# Patient Record
Sex: FEMALE | Race: WHITE | Employment: FULL TIME | URBAN - METROPOLITAN AREA
[De-identification: names, ages, dates, MRNs, and addresses within clinical notes are randomized per-mention and may not be internally consistent; named-entity substitution may affect disease eponyms.]

---

## 2020-11-06 PROBLEM — O09.511 PRIMIGRAVIDA OF ADVANCED MATERNAL AGE IN FIRST TRIMESTER: Status: ACTIVE | Noted: 2020-11-06

## 2020-11-06 PROBLEM — O09.891 MEDICATION EXPOSURE DURING FIRST TRIMESTER OF PREGNANCY: Status: ACTIVE | Noted: 2020-11-06

## 2021-01-17 ENCOUNTER — HOSPITAL ENCOUNTER (EMERGENCY)
Age: 41
Discharge: HOME | End: 2021-01-18
Attending: EMERGENCY MEDICINE
Payer: COMMERCIAL

## 2021-01-17 ENCOUNTER — APPOINTMENT (OUTPATIENT)
Dept: ULTRASOUND IMAGING | Age: 41
End: 2021-01-17
Payer: COMMERCIAL

## 2021-01-17 ENCOUNTER — HOSPITAL ENCOUNTER (OUTPATIENT)
Age: 41
Discharge: HOME | End: 2021-01-17
Attending: OBSTETRICS & GYNECOLOGY | Admitting: OBSTETRICS & GYNECOLOGY
Payer: COMMERCIAL

## 2021-01-17 VITALS
TEMPERATURE: 97.9 F | OXYGEN SATURATION: 98 % | WEIGHT: 150 LBS | RESPIRATION RATE: 15 BRPM | BODY MASS INDEX: 27.6 KG/M2 | DIASTOLIC BLOOD PRESSURE: 67 MMHG | HEIGHT: 62 IN | HEART RATE: 99 BPM | SYSTOLIC BLOOD PRESSURE: 118 MMHG

## 2021-01-17 VITALS
TEMPERATURE: 98.1 F | SYSTOLIC BLOOD PRESSURE: 118 MMHG | RESPIRATION RATE: 16 BRPM | HEART RATE: 79 BPM | DIASTOLIC BLOOD PRESSURE: 66 MMHG

## 2021-01-17 DIAGNOSIS — R10.9 RIGHT FLANK PAIN: Primary | ICD-10-CM

## 2021-01-17 PROBLEM — M54.9 BACK PAIN COMPLICATING PREGNANCY IN SECOND TRIMESTER: Status: ACTIVE | Noted: 2021-01-17

## 2021-01-17 PROBLEM — Z3A.20 20 WEEKS GESTATION OF PREGNANCY: Status: ACTIVE | Noted: 2021-01-17

## 2021-01-17 PROBLEM — O99.891 BACK PAIN COMPLICATING PREGNANCY IN SECOND TRIMESTER: Status: ACTIVE | Noted: 2021-01-17

## 2021-01-17 LAB
ALBUMIN SERPL-MCNC: 3.5 G/DL (ref 3.5–5.2)
ALP BLD-CCNC: 66 U/L (ref 35–104)
ALT SERPL-CCNC: 15 U/L (ref 0–32)
AMPHETAMINE SCREEN, URINE: NOT DETECTED
ANION GAP SERPL CALCULATED.3IONS-SCNC: 7 MMOL/L (ref 7–16)
AST SERPL-CCNC: 16 U/L (ref 0–31)
BARBITURATE SCREEN URINE: NOT DETECTED
BENZODIAZEPINE SCREEN, URINE: NOT DETECTED
BILIRUB SERPL-MCNC: 0.4 MG/DL (ref 0–1.2)
BILIRUBIN URINE: NEGATIVE
BLOOD, URINE: NEGATIVE
BUN BLDV-MCNC: 8 MG/DL (ref 6–20)
CALCIUM SERPL-MCNC: 8.4 MG/DL (ref 8.6–10.2)
CANNABINOID SCREEN URINE: NOT DETECTED
CHLORIDE BLD-SCNC: 101 MMOL/L (ref 98–107)
CLARITY: CLEAR
CO2: 24 MMOL/L (ref 22–29)
COCAINE METABOLITE SCREEN URINE: NOT DETECTED
COLOR: YELLOW
CREAT SERPL-MCNC: 0.7 MG/DL (ref 0.5–1)
FENTANYL SCREEN, URINE: NOT DETECTED
GFR AFRICAN AMERICAN: >60
GFR NON-AFRICAN AMERICAN: >60 ML/MIN/1.73
GLUCOSE BLD-MCNC: 113 MG/DL (ref 74–99)
GLUCOSE URINE: NEGATIVE MG/DL
HCT VFR BLD CALC: 35.8 % (ref 34–48)
HEMOGLOBIN: 11.7 G/DL (ref 11.5–15.5)
KETONES, URINE: NEGATIVE MG/DL
LEUKOCYTE ESTERASE, URINE: NEGATIVE
LIPASE: 12 U/L (ref 13–60)
Lab: NORMAL
MCH RBC QN AUTO: 28.7 PG (ref 26–35)
MCHC RBC AUTO-ENTMCNC: 32.7 % (ref 32–34.5)
MCV RBC AUTO: 88 FL (ref 80–99.9)
METHADONE SCREEN, URINE: NOT DETECTED
NITRITE, URINE: NEGATIVE
OPIATE SCREEN URINE: NOT DETECTED
OXYCODONE URINE: NOT DETECTED
PDW BLD-RTO: 13.2 FL (ref 11.5–15)
PH UA: 6 (ref 5–9)
PHENCYCLIDINE SCREEN URINE: NOT DETECTED
PLATELET # BLD: 294 E9/L (ref 130–450)
PMV BLD AUTO: 9.3 FL (ref 7–12)
POTASSIUM REFLEX MAGNESIUM: 3.7 MMOL/L (ref 3.5–5)
PROTEIN UA: NEGATIVE MG/DL
RBC # BLD: 4.07 E12/L (ref 3.5–5.5)
SODIUM BLD-SCNC: 132 MMOL/L (ref 132–146)
SPECIFIC GRAVITY UA: 1.02 (ref 1–1.03)
TOTAL PROTEIN: 6.1 G/DL (ref 6.4–8.3)
UROBILINOGEN, URINE: 0.2 E.U./DL
WBC # BLD: 10.5 E9/L (ref 4.5–11.5)

## 2021-01-17 PROCEDURE — 96374 THER/PROPH/DIAG INJ IV PUSH: CPT

## 2021-01-17 PROCEDURE — 99211 OFF/OP EST MAY X REQ PHY/QHP: CPT

## 2021-01-17 PROCEDURE — 80307 DRUG TEST PRSMV CHEM ANLYZR: CPT

## 2021-01-17 PROCEDURE — 99235 HOSP IP/OBS SAME DATE MOD 70: CPT | Performed by: OBSTETRICS & GYNECOLOGY

## 2021-01-17 PROCEDURE — 99285 EMERGENCY DEPT VISIT HI MDM: CPT

## 2021-01-17 PROCEDURE — 80053 COMPREHEN METABOLIC PANEL: CPT

## 2021-01-17 PROCEDURE — 6370000000 HC RX 637 (ALT 250 FOR IP): Performed by: STUDENT IN AN ORGANIZED HEALTH CARE EDUCATION/TRAINING PROGRAM

## 2021-01-17 PROCEDURE — 96375 TX/PRO/DX INJ NEW DRUG ADDON: CPT

## 2021-01-17 PROCEDURE — 6360000002 HC RX W HCPCS: Performed by: STUDENT IN AN ORGANIZED HEALTH CARE EDUCATION/TRAINING PROGRAM

## 2021-01-17 PROCEDURE — 81003 URINALYSIS AUTO W/O SCOPE: CPT

## 2021-01-17 PROCEDURE — 76770 US EXAM ABDO BACK WALL COMP: CPT

## 2021-01-17 PROCEDURE — 83690 ASSAY OF LIPASE: CPT

## 2021-01-17 PROCEDURE — 36415 COLL VENOUS BLD VENIPUNCTURE: CPT

## 2021-01-17 PROCEDURE — 6370000000 HC RX 637 (ALT 250 FOR IP): Performed by: OBSTETRICS & GYNECOLOGY

## 2021-01-17 PROCEDURE — 85027 COMPLETE CBC AUTOMATED: CPT

## 2021-01-17 RX ORDER — ASPIRIN 81 MG/1
81 TABLET ORAL DAILY
COMMUNITY

## 2021-01-17 RX ORDER — METOCLOPRAMIDE HYDROCHLORIDE 5 MG/ML
10 INJECTION INTRAMUSCULAR; INTRAVENOUS ONCE
Status: COMPLETED | OUTPATIENT
Start: 2021-01-17 | End: 2021-01-17

## 2021-01-17 RX ORDER — ACETAMINOPHEN 500 MG
1000 TABLET ORAL ONCE
Status: COMPLETED | OUTPATIENT
Start: 2021-01-17 | End: 2021-01-17

## 2021-01-17 RX ORDER — DIPHENHYDRAMINE HYDROCHLORIDE 50 MG/ML
25 INJECTION INTRAMUSCULAR; INTRAVENOUS ONCE
Status: COMPLETED | OUTPATIENT
Start: 2021-01-17 | End: 2021-01-17

## 2021-01-17 RX ORDER — FENTANYL CITRATE 50 UG/ML
25 INJECTION, SOLUTION INTRAMUSCULAR; INTRAVENOUS ONCE
Status: COMPLETED | OUTPATIENT
Start: 2021-01-17 | End: 2021-01-17

## 2021-01-17 RX ADMIN — METOCLOPRAMIDE 10 MG: 5 INJECTION, SOLUTION INTRAMUSCULAR; INTRAVENOUS at 23:24

## 2021-01-17 RX ADMIN — DIPHENHYDRAMINE HYDROCHLORIDE 25 MG: 50 INJECTION, SOLUTION INTRAMUSCULAR; INTRAVENOUS at 23:24

## 2021-01-17 RX ADMIN — ACETAMINOPHEN 1000 MG: 500 TABLET ORAL at 12:50

## 2021-01-17 RX ADMIN — ACETAMINOPHEN 1000 MG: 500 TABLET ORAL at 23:24

## 2021-01-17 RX ADMIN — FENTANYL CITRATE 25 MCG: 50 INJECTION, SOLUTION INTRAMUSCULAR; INTRAVENOUS at 23:24

## 2021-01-17 ASSESSMENT — ENCOUNTER SYMPTOMS
VOMITING: 1
COUGH: 0
WHEEZING: 0
SINUS PRESSURE: 0
ABDOMINAL PAIN: 0
EYE REDNESS: 0
BLOOD IN STOOL: 0
EYE PAIN: 0
EYE DISCHARGE: 0
ABDOMINAL DISTENTION: 0
SHORTNESS OF BREATH: 0
DIARRHEA: 0
CONSTIPATION: 0
NAUSEA: 1
BACK PAIN: 0
SORE THROAT: 0

## 2021-01-17 ASSESSMENT — PAIN DESCRIPTION - ONSET: ONSET: GRADUAL

## 2021-01-17 ASSESSMENT — PAIN DESCRIPTION - LOCATION: LOCATION: FLANK

## 2021-01-17 ASSESSMENT — PAIN DESCRIPTION - PAIN TYPE: TYPE: ACUTE PAIN

## 2021-01-17 ASSESSMENT — PAIN DESCRIPTION - PROGRESSION: CLINICAL_PROGRESSION: GRADUALLY WORSENING

## 2021-01-17 ASSESSMENT — PAIN SCALES - GENERAL
PAINLEVEL_OUTOF10: 10
PAINLEVEL_OUTOF10: 10

## 2021-01-17 ASSESSMENT — PAIN DESCRIPTION - DESCRIPTORS: DESCRIPTORS: ACHING;CONSTANT

## 2021-01-17 ASSESSMENT — PAIN DESCRIPTION - FREQUENCY: FREQUENCY: CONTINUOUS

## 2021-01-17 NOTE — H&P
Subjective:      Diana Prader is an 45 y.o. female at 21 and 5/7 weeks gestation presenting with   Chief Complaint   Patient presents with    Abdominal Cramping    Back Pain   Patient claims that her symptoms started after trying to lift something heavy and she believes she pulled a muscle. Denies bleeding or fluid leak. Other associated symptoms include low back pain. Fetal Movement: normal.  Past Medical History:   Diagnosis Date    Anxiety     Depression        Review of Systems  Pertinent items are noted in HPI. Objective:      /66   Pulse 79   Temp 98.1 °F (36.7 °C) (Oral)   Resp 16   LMP 08/25/2020   Blood pressure 118/66, pulse 79, temperature 98.1 °F (36.7 °C), temperature source Oral, resp. rate 16, last menstrual period 08/25/2020. General:   alert, appears stated age and cooperative   Cervix:   Deferred.    FHT:   135 BPM   No CVA tenderness  Lab Review    CBC:   Lab Results   Component Value Date    WBC 10.5 01/17/2021    RBC 4.07 01/17/2021    HGB 11.7 01/17/2021    HCT 35.8 01/17/2021    MCV 88.0 01/17/2021    MCH 28.7 01/17/2021    MCHC 32.7 01/17/2021    RDW 13.2 01/17/2021     01/17/2021    MPV 9.3 01/17/2021     CMP:  No results found for: NA, K, CL, CO2, BUN, CREATININE, GFRAA, AGRATIO, LABGLOM, GLUCOSE, PROT, LABALBU, CALCIUM, BILITOT, ALKPHOS, AST, ALT  U/A:    Lab Results   Component Value Date    COLORU Yellow 01/17/2021    PHUR 6.0 01/17/2021    CLARITYU Clear 01/17/2021    SPECGRAV 1.020 01/17/2021    LEUKOCYTESUR Negative 01/17/2021    UROBILINOGEN 0.2 01/17/2021    BILIRUBINUR Negative 01/17/2021    BLOODU Negative 01/17/2021    GLUCOSEU Negative 01/17/2021          Assessment:     Right lower back pain, consistent with muscle injury  20 weeks gestation    Plan:     Heating pad, Tylenol as needed, CBC and UA    Amine R MD Be,1/17/2021 12:24 PM

## 2021-01-17 NOTE — PROGRESS NOTES
Discharge instructions given to pt. Pt verbalizes understanding of instructions. Pt leaves unit ambulatory with significant other.

## 2021-01-17 NOTE — PROGRESS NOTES
Patient presents to labor and delivery , @20.5 with complaints states she thinks she pulled a muscle on her lower right side of her back. She states she then began feeling nauseas and cramping. She rates it a 5/10 describes as a aching feeling. Denies LOF and VB. States she does feel movement at times.

## 2021-01-18 RX ORDER — ACETAMINOPHEN 500 MG
500 TABLET ORAL EVERY 6 HOURS PRN
Qty: 60 TABLET | Refills: 0 | Status: SHIPPED | OUTPATIENT
Start: 2021-01-18

## 2021-01-18 NOTE — ED PROVIDER NOTES
HPI   78-year-old female patient presented to emergency department with right flank pain for the last 2 days. Patient is 21 weeks pregnant. Patient was seen upstairs by OB earlier today and was told that her fetus is okay. Patient was discharged home she been having persistent right-sided flank pain with nausea and vomiting. Patient says that she cannot keep anything down. No diarrhea no constipation, no cough, no leg swelling, no chest pain, no shortness of breath. Patient describes her flank pain as constant, throbbing, not better or worse with anything, she cannot find a position of comfort, pain is nonradiating. She is not having any abdominal pain. She has a history of kidney stone and feels like today's pain is similar. She does recall lifting heavy pack of water bottles a day prior to pain onset but does not remember any direct injury or trauma to her back. Review of Systems   Constitutional: Negative for chills and fever. HENT: Negative for ear pain, sinus pressure and sore throat. Eyes: Negative for pain, discharge and redness. Respiratory: Negative for cough, shortness of breath and wheezing. Cardiovascular: Negative for chest pain. Gastrointestinal: Positive for nausea and vomiting. Negative for abdominal distention, abdominal pain, blood in stool, constipation and diarrhea. Genitourinary: Negative for dysuria, frequency, vaginal bleeding and vaginal discharge. No gush of fluids   Musculoskeletal: Negative for arthralgias and back pain. Right sided flank pain   Skin: Negative for rash and wound. Neurological: Negative for weakness and headaches. Hematological: Negative for adenopathy. All other systems reviewed and are negative. Physical Exam  Vitals signs and nursing note reviewed. Constitutional:       Appearance: Normal appearance. HENT:      Head: Normocephalic and atraumatic. Nose: Nose normal. No congestion.       Mouth/Throat: Mouth: Mucous membranes are moist.      Pharynx: Oropharynx is clear. Eyes:      Conjunctiva/sclera: Conjunctivae normal.      Pupils: Pupils are equal, round, and reactive to light. Neck:      Musculoskeletal: Normal range of motion and neck supple. Cardiovascular:      Rate and Rhythm: Normal rate and regular rhythm. Pulses: Normal pulses. Heart sounds: Normal heart sounds. Pulmonary:      Effort: Pulmonary effort is normal. No respiratory distress. Breath sounds: Normal breath sounds. Abdominal:      General: Bowel sounds are normal. There is no distension. Tenderness: There is no abdominal tenderness. There is right CVA tenderness (mild). Musculoskeletal: Normal range of motion. Right lower leg: No edema. Left lower leg: No edema. Comments: No calf tenderness. Positive straight leg test on the right side. Skin:     General: Skin is warm and dry. Capillary Refill: Capillary refill takes less than 2 seconds. Neurological:      General: No focal deficit present. Mental Status: She is alert. Sensory: Sensation is intact. Motor: Motor function is intact. Deep Tendon Reflexes:      Reflex Scores:       Patellar reflexes are 2+ on the right side and 2+ on the left side. Achilles reflexes are 2+ on the right side and 2+ on the left side.          Procedures     MDM 60-year-old female patient here in the emergency department for right-sided flank pain for the last 2 days. Patient was seen and evaluated upstairs by OB/GYN earlier today she is currently 20 weeks pregnant. They said that the fetus is doing well and she was discharged. Symptoms may be related to possible strain versus kidney stone. Urinalysis was negative for blood and renal ultrasound showed mild right-sided hydronephrosis. Due to patient's patient's symptoms she was given Reglan Benadryl here with improvement of her nausea as well as fentanyl. On reeval patient was feeling much better and pain was gone. Follow-up given with OB/GYN here because patient's current OB/GYN is in [place] and she currently lives in [facility]. Patient also to follow-up with her primary care physician. Discharge instructions given as well as return precautions.           --------------------------------------------- PAST HISTORY ---------------------------------------------  Past Medical History:  has a past medical history of Anxiety, Back pain complicating pregnancy in second trimester, and Depression. Past Surgical History:  has a past surgical history that includes knee surgery (Left). Social History:  reports that she has never smoked. She has never used smokeless tobacco. She reports previous alcohol use. She reports that she does not use drugs. Family History: family history includes Heart Disease in her paternal grandfather; Stroke in her father and maternal grandmother. The patients home medications have been reviewed. Allergies: Patient has no known allergies.     -------------------------------------------------- RESULTS -------------------------------------------------  Labs:  Results for orders placed or performed during the hospital encounter of 01/17/21   Lipase   Result Value Ref Range    Lipase 12 (L) 13 - 60 U/L   Comprehensive Metabolic Panel w/ Reflex to MG   Result Value Ref Range Sodium 132 132 - 146 mmol/L    Potassium reflex Magnesium 3.7 3.5 - 5.0 mmol/L    Chloride 101 98 - 107 mmol/L    CO2 24 22 - 29 mmol/L    Anion Gap 7 7 - 16 mmol/L    Glucose 113 (H) 74 - 99 mg/dL    BUN 8 6 - 20 mg/dL    CREATININE 0.7 0.5 - 1.0 mg/dL    GFR Non-African American >60 >=60 mL/min/1.73    GFR African American >60     Calcium 8.4 (L) 8.6 - 10.2 mg/dL    Total Protein 6.1 (L) 6.4 - 8.3 g/dL    Alb 3.5 3.5 - 5.2 g/dL    Total Bilirubin 0.4 0.0 - 1.2 mg/dL    Alkaline Phosphatase 66 35 - 104 U/L    ALT 15 0 - 32 U/L    AST 16 0 - 31 U/L       Radiology:  US RETROPERITONEAL COMPLETE   Final Result   Mild right-sided hydronephrosis.          ------------------------- NURSING NOTES AND VITALS REVIEWED ---------------------------  Date / Time Roomed:  1/17/2021 10:49 PM  ED Bed Assignment:  09/09    The nursing notes within the ED encounter and vital signs as below have been reviewed. /67   Pulse 99   Temp 97.9 °F (36.6 °C) (Oral)   Resp 15   Ht 5' 2\" (1.575 m)   Wt 150 lb (68 kg)   LMP 08/25/2020   SpO2 98%   BMI 27.44 kg/m²   Oxygen Saturation Interpretation: Normal      ------------------------------------------ PROGRESS NOTES ------------------------------------------  12:48 AM EST  I have spoken with the patient and discussed todays results, in addition to providing specific details for the plan of care and counseling regarding the diagnosis and prognosis. Their questions are answered at this time and they are agreeable with the plan. I discussed at length with them reasons for immediate return here for re evaluation.  They will followup with their primary care physician by calling their office on Monday.      --------------------------------- ADDITIONAL PROVIDER NOTES --------------------------------- At this time the patient is without objective evidence of an acute process requiring hospitalization or inpatient management. They have remained hemodynamically stable throughout their entire ED visit and are stable for discharge with outpatient follow-up. The plan has been discussed in detail and they are aware of the specific conditions for emergent return, as well as the importance of follow-up. Discharge Medication List as of 1/18/2021 12:31 AM      START taking these medications    Details   acetaminophen (APAP EXTRA STRENGTH) 500 MG tablet Take 1 tablet by mouth every 6 hours as needed for Pain, Disp-60 tablet, R-0Normal             Diagnosis:  1. Right flank pain        Disposition:  Patient's disposition: Discharge to home  Patient's condition is stable.                  Chapis Villalobos DO  Resident  01/18/21 [id number]